# Patient Record
(demographics unavailable — no encounter records)

---

## 2025-01-15 NOTE — PHYSICAL EXAM
[Alert] : alert [No Acute Distress] : no acute distress [Playful] : playful [Normocephalic] : normocephalic [Conjunctivae with no discharge] : conjunctivae with no discharge [PERRL] : PERRL [EOMI Bilateral] : EOMI bilateral [Auricles Well Formed] : auricles well formed [Clear Tympanic membranes with present light reflex and bony landmarks] : clear tympanic membranes with present light reflex and bony landmarks [No Discharge] : no discharge [Nares Patent] : nares patent [Pink Nasal Mucosa] : pink nasal mucosa [Palate Intact] : palate intact [Uvula Midline] : uvula midline [Nonerythematous Oropharynx] : nonerythematous oropharynx [Trachea Midline] : trachea midline [Supple, full passive range of motion] : supple, full passive range of motion [No Palpable Masses] : no palpable masses [Symmetric Chest Rise] : symmetric chest rise [Clear to Auscultation Bilaterally] : clear to auscultation bilaterally [Normoactive Precordium] : normoactive precordium [Regular Rate and Rhythm] : regular rate and rhythm [Normal S1, S2 present] : normal S1, S2 present [No Murmurs] : no murmurs [Soft] : soft [NonTender] : non tender [Non Distended] : non distended [Normoactive Bowel Sounds] : normoactive bowel sounds [No Hepatomegaly] : no hepatomegaly [No Splenomegaly] : no splenomegaly [Rafat 1] : Rafat 1 [Circumcised] : circumcised [Central Urethral Opening] : central urethral opening [Testicles Descended Bilaterally] : testicles descended bilaterally [No Abnormal Lymph Nodes Palpated] : no abnormal lymph nodes palpated [Symmetric Buttocks Creases] : symmetric buttocks creases [Symmetric Hip Rotation] : symmetric hip rotation [No Gait Asymmetry] : no gait asymmetry [No pain or deformities with palpation of bone, muscles, joints] : no pain or deformities with palpation of bone, muscles, joints [Normal Muscle Tone] : normal muscle tone [No Spinal Dimple] : no spinal dimple [NoTuft of Hair] : no tuft of hair [Straight] : straight [+2 Patella DTR] : +2 patella DTR [Cranial Nerves Grossly Intact] : cranial nerves grossly intact [No Rash or Lesions] : no rash or lesions

## 2025-01-15 NOTE — HISTORY OF PRESENT ILLNESS
[Parents] : parents [whole ___ oz/d] : consumes [unfilled] oz of whole cow's milk per day [Toilet Trained] : toilet trained [Normal] : Normal [Brushing teeth] : Brushing teeth [Toothpaste] : Primary Fluoride Source: Toothpaste [In Pre-K] : In Pre-K [Playtime (60 min/d)] : Playtime 60 min a day [Appropiate parent-child communication] : Appropriate parent-child communication [Child Cooperates] : Child cooperates [Yes] : Cigarette smoke exposure [No] : Not at  exposure [Exposure to electronic nicotine delivery system] : Exposure to electronic nicotine delivery system [NO] : No [de-identified] : well balanced; OJ and yogurt drinks [FreeTextEntry3] : sleeps with parents [de-identified] : overdue for dentist [de-identified] : needs DTap & IPV & Flu [FreeTextEntry1] : New Patient History PMHx:  denies PSHx:  circumcision and mild hypospadias correction (2y/o & 1y/o); mulluscum removal w/ correction Meds:  denies All:  NKA SHx: lives home with mom and dad and brother 3y/o; no pets; +vaping (aunts), +cigarettes occasionally (grandparents); no pets; no guns FHx: paternal uncle asthma; grandparents - T2DM & HTN BHx:  FT, , no NICU Dev:  appropriate Specialists:  Urologist (done with follow up) Previous PMD:  Yesica

## 2025-01-15 NOTE — HISTORY OF PRESENT ILLNESS
[Parents] : parents [whole ___ oz/d] : consumes [unfilled] oz of whole cow's milk per day [Toilet Trained] : toilet trained [Normal] : Normal [Brushing teeth] : Brushing teeth [Toothpaste] : Primary Fluoride Source: Toothpaste [In Pre-K] : In Pre-K [Playtime (60 min/d)] : Playtime 60 min a day [Appropiate parent-child communication] : Appropriate parent-child communication [Child Cooperates] : Child cooperates [Yes] : Cigarette smoke exposure [No] : Not at  exposure [Exposure to electronic nicotine delivery system] : Exposure to electronic nicotine delivery system [NO] : No [de-identified] : well balanced; OJ and yogurt drinks [FreeTextEntry3] : sleeps with parents [de-identified] : overdue for dentist [de-identified] : needs DTap & IPV & Flu [FreeTextEntry1] : New Patient History PMHx:  denies PSHx:  circumcision and mild hypospadias correction (2y/o & 1y/o); mulluscum removal w/ correction Meds:  denies All:  NKA SHx: lives home with mom and dad and brother 5y/o; no pets; +vaping (aunts), +cigarettes occasionally (grandparents); no pets; no guns FHx: paternal uncle asthma; grandparents - T2DM & HTN BHx:  FT, , no NICU Dev:  appropriate Specialists:  Urologist (done with follow up) Previous PMD:  Yesica

## 2025-01-15 NOTE — DISCUSSION/SUMMARY
[School Readiness] : school readiness [Healthy Personal Habits] : healthy personal habits [TV/Media] : tv/media [Child and Family Involvement] : child and family involvement [Safety] : safety [Anticipatory Guidance Given] : Anticipatory guidance addressed as per the history of present illness section [] : The components of the vaccine(s) to be administered today are listed in the plan of care. The disease(s) for which the vaccine(s) are intended to prevent and the risks have been discussed with the caretaker.  The risks are also included in the appropriate vaccination information statements which have been provided to the patient's caregiver.  The caregiver has given consent to vaccinate. [FreeTextEntry1] : 4 year old F presenting for HCM. Growth and development normal. Vision & hearing screen passed. Immunizations due.  - Routine care & anticipatory guidance given - Vaccines given:  Flu & IPV today.  Dtap at next visit. - Post vaccine care discussed & potential side effects reviewed - Labs done at start of school year per mom, records pending - Referred to dental for routine screen - RTC for 5 year old HCM and prn  Caretaker expressed understanding of the plan and agrees. All questions were answered.

## 2025-06-30 NOTE — HISTORY OF PRESENT ILLNESS
[de-identified] : lumps [FreeTextEntry6] : 3y/o M with bumps on both ankles for 2 months Feels they are not symmetrical  Subjective:   - Summary: The patient, a young boy, is presenting with bilateral ankle swelling and reports of fatigue during walking. The dad has noticed these symptoms for approximately 2 months.   - Chief Complaint (CC): Bilateral ankle swelling and fatigue during walking for 2-3 months.   - History of Present Illness: The patient is a young boy presenting with bilateral ankle swelling noticed by his dad approximately 2 months ago. The swelling is more prominent on one ankle but has recently appeared on the other as well. The dad describes the swellings as soft and mobile, sometimes feeling more like bone. The swellings are visible and palpable, with some areas appearing yellowish. The patient reports fatigue during walking for the past six months, but this does not affect his ability to play for hours with his cousins. The dad initially thought the swelling might be due to tight shoes and purchased larger sizes, but the issue persisted. The swellings are not painful to the touch.   - Past Medical History: No significant past medical history mentioned.   - Past Surgical History: No past surgical history mentioned.   - Family History:   - Social History:     - Diet consists mostly of burgers and pizza, with efforts made to include healthier options like plant patties and organic pizzas     - Picky eater, struggles with eating a varied diet     - Does not eat steak, but will eat chicken burgers   - Review of Systems:     - General: Reports fatigue during walking     - Neurological: No neurological symptoms mentioned     - Musculoskeletal: Bilateral ankle swelling, no pain reported     - Cardiovascular: No cardiovascular symptoms mentioned     - Respiratory: No respiratory symptoms mentioned     - Gastrointestinal: No gastrointestinal symptoms mentioned     - Genitourinary: No genitourinary symptoms mentioned     - Integumentary: Yellowish discoloration noted on ankle swellings     - Psychiatric: No psychiatric symptoms mentioned   - Medications: No current medications mentioned   - Allergies: No allergies mentioned

## 2025-06-30 NOTE — HISTORY OF PRESENT ILLNESS
[de-identified] : lumps [FreeTextEntry6] : 5y/o M with bumps on both ankles for 2 months Feels they are not symmetrical  Subjective:   - Summary: The patient, a young boy, is presenting with bilateral ankle swelling and reports of fatigue during walking. The dad has noticed these symptoms for approximately 2 months.   - Chief Complaint (CC): Bilateral ankle swelling and fatigue during walking for 2-3 months.   - History of Present Illness: The patient is a young boy presenting with bilateral ankle swelling noticed by his dad approximately 2 months ago. The swelling is more prominent on one ankle but has recently appeared on the other as well. The dad describes the swellings as soft and mobile, sometimes feeling more like bone. The swellings are visible and palpable, with some areas appearing yellowish. The patient reports fatigue during walking for the past six months, but this does not affect his ability to play for hours with his cousins. The dad initially thought the swelling might be due to tight shoes and purchased larger sizes, but the issue persisted. The swellings are not painful to the touch.   - Past Medical History: No significant past medical history mentioned.   - Past Surgical History: No past surgical history mentioned.   - Family History:   - Social History:     - Diet consists mostly of burgers and pizza, with efforts made to include healthier options like plant patties and organic pizzas     - Picky eater, struggles with eating a varied diet     - Does not eat steak, but will eat chicken burgers   - Review of Systems:     - General: Reports fatigue during walking     - Neurological: No neurological symptoms mentioned     - Musculoskeletal: Bilateral ankle swelling, no pain reported     - Cardiovascular: No cardiovascular symptoms mentioned     - Respiratory: No respiratory symptoms mentioned     - Gastrointestinal: No gastrointestinal symptoms mentioned     - Genitourinary: No genitourinary symptoms mentioned     - Integumentary: Yellowish discoloration noted on ankle swellings     - Psychiatric: No psychiatric symptoms mentioned   - Medications: No current medications mentioned   - Allergies: No allergies mentioned

## 2025-07-28 NOTE — DATA REVIEWED
[Normal] : X-ray revealed no displaced fractures, dislocations or other abnormalities. [de-identified] :  3 views of bl feet reviewed.

## 2025-07-28 NOTE — ASSESSMENT
[FreeTextEntry1] : Advised that this bony prominence it is likely part of his anatomy. Discussed wearing wider shoes can help. As long as it doesn't become overly painful it should be fine. Ganglion cyst of the left foot as well. Follow up PRN.

## 2025-07-28 NOTE — DATA REVIEWED
[Normal] : X-ray revealed no displaced fractures, dislocations or other abnormalities. [de-identified] :  3 views of bl feet reviewed.